# Patient Record
Sex: MALE | Race: WHITE | NOT HISPANIC OR LATINO | Employment: FULL TIME | URBAN - METROPOLITAN AREA
[De-identification: names, ages, dates, MRNs, and addresses within clinical notes are randomized per-mention and may not be internally consistent; named-entity substitution may affect disease eponyms.]

---

## 2019-02-18 ENCOUNTER — RECORDS - HEALTHEAST (OUTPATIENT)
Dept: LAB | Facility: CLINIC | Age: 24
End: 2019-02-18

## 2019-02-18 LAB — HIV 1+2 AB+HIV1 P24 AG SERPL QL IA: NEGATIVE

## 2019-02-19 LAB
C TRACH DNA SPEC QL PROBE+SIG AMP: NEGATIVE
T PALLIDUM AB SER QL: NEGATIVE

## 2021-02-18 ENCOUNTER — AMBULATORY - HEALTHEAST (OUTPATIENT)
Dept: NURSING | Facility: CLINIC | Age: 26
End: 2021-02-18

## 2021-03-11 ENCOUNTER — AMBULATORY - HEALTHEAST (OUTPATIENT)
Dept: NURSING | Facility: CLINIC | Age: 26
End: 2021-03-11

## 2021-08-22 ENCOUNTER — HEALTH MAINTENANCE LETTER (OUTPATIENT)
Age: 26
End: 2021-08-22

## 2021-10-17 ENCOUNTER — HEALTH MAINTENANCE LETTER (OUTPATIENT)
Age: 26
End: 2021-10-17

## 2022-06-14 ENCOUNTER — HOSPITAL ENCOUNTER (EMERGENCY)
Facility: CLINIC | Age: 27
Discharge: LEFT BEFORE DISCHARGE | End: 2022-06-14
Payer: OTHER MISCELLANEOUS

## 2022-06-15 ENCOUNTER — HOSPITAL ENCOUNTER (EMERGENCY)
Facility: CLINIC | Age: 27
Discharge: HOME | End: 2022-06-15
Attending: EMERGENCY MEDICINE | Admitting: EMERGENCY MEDICINE
Payer: OTHER MISCELLANEOUS

## 2022-06-15 VITALS
SYSTOLIC BLOOD PRESSURE: 113 MMHG | RESPIRATION RATE: 18 BRPM | HEIGHT: 73 IN | TEMPERATURE: 98.2 F | DIASTOLIC BLOOD PRESSURE: 56 MMHG | BODY MASS INDEX: 24.52 KG/M2 | OXYGEN SATURATION: 99 % | WEIGHT: 185 LBS | HEART RATE: 56 BPM

## 2022-06-15 DIAGNOSIS — S06.0X0A CONCUSSION WITHOUT LOSS OF CONSCIOUSNESS, INITIAL ENCOUNTER: ICD-10-CM

## 2022-06-15 PROCEDURE — 99282 EMERGENCY DEPT VISIT SF MDM: CPT

## 2022-06-15 NOTE — ED TRIAGE NOTES
Pt presents to the ED after being kicked by a 10 year wearing shoes yesterday at 1240 while at work.  Pr denies LOC, reports ongoing HA, photophobia, and sensitivity to stimulation.     Triage Assessment     Row Name 06/15/22 1217       Triage Assessment (Adult)    Airway WDL WDL       Respiratory WDL    Respiratory WDL WDL       Skin Circulation/Temperature WDL    Skin Circulation/Temperature WDL WDL       Cardiac WDL    Cardiac WDL WDL       Peripheral/Neurovascular WDL    Peripheral Neurovascular WDL WDL       Cognitive/Neuro/Behavioral WDL    Cognitive/Neuro/Behavioral WDL WDL

## 2022-06-15 NOTE — ED PROVIDER NOTES
"History   Chief Complaint:  Head injury    HPI   History supplemented by electronic chart review    Rico Knight is a 26 year old male who presents for evaluation of a head injury sustained yesterday.  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  He is experienced some degree of headache as well as increased sensitivity to light and sound and has been more irritable since then.  He came to the emergency department yesterday but left without being seen due to very prolonged wait to be seen.  He returns today for evaluation seeking specific guidance on work restrictions and other care for what he thinks is likely concussion.  No vomiting.  No other injuries.  No confusion.  Arms and legs feel fine.  He took some Tylenol this morning and does not wish for any additional analgesics at this time.    Review of Systems  All other systems reviewed and negative except as above in HPI.    Allergies:  Penicillins     Medications:    No current outpatient medications on file.    Past Medical History:    No chronic medical conditions    Past Surgical History:    No past surgical history on file.     Family History:    family history is not on file.    Social History:  +Emplyoed.    Physical Exam   Patient Vitals for the past 24 hrs:   BP Temp Temp src Pulse Resp SpO2 Height Weight   06/15/22 1216 113/56 98.2  F (36.8  C) Temporal 56 18 99 % 1.854 m (6' 1\") 83.9 kg (185 lb)      Physical Exam  General: Nontoxic-appearing male who ambulated independently into fast-track room 2  HENT: face with minimal diffuse tenderness to the right cheek with full painless ROM mandible, skull nontender, no bony deformity, OP clear, no difficulty controlling secretions, no hemotympanum, dentition within normal limits  Eyes: pupils round and reactive, no nystagmus, no raccoon " eyes  CV: rate as above, regular rhythm  Resp: normal effort, speaks in full phrases  GI: abdomen soft and nontender  MSK:  Cervical spine: no midline tenderness, FROM  Extremities: no focal tenderness  Skin:   No abrasion  No ecchymosis  No mastoid ecchymosis.  No laceration  Neuro: awake, alert, GCS 15, responds appropriately to commands, face symmetric,  normal, strength and sensation normal in all extremities, ambulatory with steady gait  Psych: cooperative, pleasant    Emergency Department Course   Emergency Department Course:  Reviewed:  I reviewed nursing notes, vitals, and past medical history    Assessments:  I obtained history and examined the patient as noted above.     Disposition:  Discharged to home    Impression & Plan    Medical Decision Making:  Symptoms are compatible with concussion, which was discussed in detail with this very pleasant patient.  We discussed the rationale for deferring head CT given that he does not have features suggestive of intracranial hemorrhage or skull fracture, nor facial fracture.  He is neurologically intact.  Work restrictions discussed in detail and provided in writing at patient request.  No signs or symptoms of additional injury at this time.  The need for follow-up for reevaluation to determine potential need for additional restrictions were specifically reviewed with the patient who declined any analgesics here prior to discharge.    Diagnosis:    ICD-10-CM    1. Concussion without loss of consciousness, initial encounter  S06.0X0A       6/15/2022   PRINCE Castle MD    This note was completed in part using Dragon voice recognition software. Although reviewed after completion, some word and grammatical errors may occur.           Luis Enrique Castle MD  06/15/22 [id number]

## 2022-06-15 NOTE — Clinical Note
Rico Knight was seen and treated in our emergency department on 6/15/2022.  He may return to work on 06/20/2022.  For the week of June 20-26, I recommend that you not work more than 4 hours each day, and minimize exposure to bright lights and loud noises as much as possible.  It would be helpful if you can be reassigned to duties that minimize your risk for additional injury or volatile activities.  If you are feeling improved, you can gradually resume longer hours and more normal activities before then.     If you have any questions or concerns, please don't hesitate to call.      Luis Enrique Castle MD

## 2022-10-02 ENCOUNTER — HEALTH MAINTENANCE LETTER (OUTPATIENT)
Age: 27
End: 2022-10-02

## 2023-10-21 ENCOUNTER — HEALTH MAINTENANCE LETTER (OUTPATIENT)
Age: 28
End: 2023-10-21

## 2024-12-14 ENCOUNTER — HEALTH MAINTENANCE LETTER (OUTPATIENT)
Age: 29
End: 2024-12-14